# Patient Record
Sex: FEMALE | NOT HISPANIC OR LATINO | URBAN - METROPOLITAN AREA
[De-identification: names, ages, dates, MRNs, and addresses within clinical notes are randomized per-mention and may not be internally consistent; named-entity substitution may affect disease eponyms.]

---

## 2017-01-26 ENCOUNTER — IMPORTED ENCOUNTER (OUTPATIENT)
Dept: URBAN - METROPOLITAN AREA CLINIC 50 | Facility: CLINIC | Age: 67
End: 2017-01-26

## 2017-05-01 ENCOUNTER — IMPORTED ENCOUNTER (OUTPATIENT)
Dept: URBAN - METROPOLITAN AREA CLINIC 50 | Facility: CLINIC | Age: 67
End: 2017-05-01

## 2017-05-09 ENCOUNTER — IMPORTED ENCOUNTER (OUTPATIENT)
Dept: URBAN - METROPOLITAN AREA CLINIC 50 | Facility: CLINIC | Age: 67
End: 2017-05-09

## 2017-05-12 ENCOUNTER — IMPORTED ENCOUNTER (OUTPATIENT)
Dept: URBAN - METROPOLITAN AREA CLINIC 50 | Facility: CLINIC | Age: 67
End: 2017-05-12

## 2017-05-17 ENCOUNTER — IMPORTED ENCOUNTER (OUTPATIENT)
Dept: URBAN - METROPOLITAN AREA CLINIC 50 | Facility: CLINIC | Age: 67
End: 2017-05-17

## 2017-05-24 ENCOUNTER — IMPORTED ENCOUNTER (OUTPATIENT)
Dept: URBAN - METROPOLITAN AREA CLINIC 50 | Facility: CLINIC | Age: 67
End: 2017-05-24

## 2017-05-26 ENCOUNTER — IMPORTED ENCOUNTER (OUTPATIENT)
Dept: URBAN - METROPOLITAN AREA CLINIC 50 | Facility: CLINIC | Age: 67
End: 2017-05-26

## 2017-05-31 ENCOUNTER — IMPORTED ENCOUNTER (OUTPATIENT)
Dept: URBAN - METROPOLITAN AREA CLINIC 50 | Facility: CLINIC | Age: 67
End: 2017-05-31

## 2017-06-12 ENCOUNTER — IMPORTED ENCOUNTER (OUTPATIENT)
Dept: URBAN - METROPOLITAN AREA CLINIC 50 | Facility: CLINIC | Age: 67
End: 2017-06-12

## 2017-06-30 ENCOUNTER — IMPORTED ENCOUNTER (OUTPATIENT)
Dept: URBAN - METROPOLITAN AREA CLINIC 50 | Facility: CLINIC | Age: 67
End: 2017-06-30

## 2017-09-26 ENCOUNTER — IMPORTED ENCOUNTER (OUTPATIENT)
Dept: URBAN - METROPOLITAN AREA CLINIC 50 | Facility: CLINIC | Age: 67
End: 2017-09-26

## 2017-09-27 ENCOUNTER — IMPORTED ENCOUNTER (OUTPATIENT)
Dept: URBAN - METROPOLITAN AREA CLINIC 50 | Facility: CLINIC | Age: 67
End: 2017-09-27

## 2017-10-03 ENCOUNTER — IMPORTED ENCOUNTER (OUTPATIENT)
Dept: URBAN - METROPOLITAN AREA CLINIC 50 | Facility: CLINIC | Age: 67
End: 2017-10-03

## 2017-10-09 ENCOUNTER — IMPORTED ENCOUNTER (OUTPATIENT)
Dept: URBAN - METROPOLITAN AREA CLINIC 50 | Facility: CLINIC | Age: 67
End: 2017-10-09

## 2018-02-05 ENCOUNTER — IMPORTED ENCOUNTER (OUTPATIENT)
Dept: URBAN - METROPOLITAN AREA CLINIC 50 | Facility: CLINIC | Age: 68
End: 2018-02-05

## 2018-02-23 ENCOUNTER — IMPORTED ENCOUNTER (OUTPATIENT)
Dept: URBAN - METROPOLITAN AREA CLINIC 50 | Facility: CLINIC | Age: 68
End: 2018-02-23

## 2018-03-30 ENCOUNTER — IMPORTED ENCOUNTER (OUTPATIENT)
Dept: URBAN - METROPOLITAN AREA CLINIC 50 | Facility: CLINIC | Age: 68
End: 2018-03-30

## 2018-04-05 ENCOUNTER — IMPORTED ENCOUNTER (OUTPATIENT)
Dept: URBAN - METROPOLITAN AREA CLINIC 50 | Facility: CLINIC | Age: 68
End: 2018-04-05

## 2018-07-19 ENCOUNTER — IMPORTED ENCOUNTER (OUTPATIENT)
Dept: URBAN - METROPOLITAN AREA CLINIC 50 | Facility: CLINIC | Age: 68
End: 2018-07-19

## 2018-10-08 ENCOUNTER — IMPORTED ENCOUNTER (OUTPATIENT)
Dept: URBAN - METROPOLITAN AREA CLINIC 50 | Facility: CLINIC | Age: 68
End: 2018-10-08

## 2019-07-11 ENCOUNTER — IMPORTED ENCOUNTER (OUTPATIENT)
Dept: URBAN - METROPOLITAN AREA CLINIC 50 | Facility: CLINIC | Age: 69
End: 2019-07-11

## 2020-07-01 ENCOUNTER — IMPORTED ENCOUNTER (OUTPATIENT)
Dept: URBAN - METROPOLITAN AREA CLINIC 50 | Facility: CLINIC | Age: 70
End: 2020-07-01

## 2020-08-10 ENCOUNTER — IMPORTED ENCOUNTER (OUTPATIENT)
Dept: URBAN - METROPOLITAN AREA CLINIC 50 | Facility: CLINIC | Age: 70
End: 2020-08-10

## 2021-05-14 ASSESSMENT — VISUAL ACUITY
OS_OTHER: 20/30. 20/40.
OS_SC: 20/30
OD_OTHER: 20/30. 20/40.
OS_SC: 20/30-2+2
OS_CC: J1@ 15 IN
OS_OTHER: 20/60. 20/100.
OS_SC: 20/30-1
OD_PH: @ 16 IN
OS_CC: J1
OS_CC: J1+
OS_CC: 20/25-1
OS_BAT: 20/30
OS_BAT: 20/60
OD_CC: J1@ 15 IN
OD_PH: 20/30
OD_BAT: 20/60
OD_SC: 20/40
OD_CC: J1+@ 15 IN
OS_BAT: 20/30
OD_OTHER: 20/80. 20/100.
OS_CC: J1+
OD_SC: 20/25-1
OD_OTHER: 20/60. 20/100.
OD_CC: J1+@ 16 IN
OS_PH: 20/25
OS_OTHER: 20/30. 20/30.
OD_PH: 20/30-
OD_SC: 20/30-1
OD_CC: J1NEAR
OD_OTHER: 20/60. 20/100.
OS_PH: @ 16 IN
OS_SC: 20/50
OD_BAT: 20/40
OS_PH: 20/30-
OS_PH: @ 15 IN
OD_SC: 20/40
OS_PH: @ 17 IN
OS_OTHER: 20/30. 20/60.
OD_SC: 20/100
OS_BAT: 20/30
OS_BAT: 20/30
OS_BAT: 20/60
OD_SC: 20/40
OD_BAT: 20/40
OD_CC: J1+
OS_SC: 20/50
OD_CC: 20/30-1
OS_BAT: 20/50
OD_CC: J1
OD_BAT: 20/30
OS_CC: 20/40-
OS_SC: 20/40
OD_BAT: 20/30
OD_BAT: 20/30
OS_CC: 20/30-2
OD_OTHER: 20/40. 20/60.
OD_SC: 20/30
OS_OTHER: 20/30. 20/30.
OD_SC: 20/40+
OD_CC: J1+
OD_CC: 20/40
OS_OTHER: 20/30. 20/60.
OS_CC: J1
OS_PH: 20/25
OS_PH: 20/30+
OD_PH: @ 17 IN
OS_CC: J1+@ 16 IN
OS_BAT: 20/30
OD_PH: 20/25
OD_PH: @ 15 IN
OD_SC: 20/50
OD_PH: 20/25
OS_SC: 20/50
OS_SC: 20/30-
OD_OTHER: 20/30. 20/30.
OS_OTHER: 20/60. 20/80.
OS_SC: 20/60
OD_PH: 20/25
OS_OTHER: 20/30. 20/40.
OD_SC: 20/30
OS_CC: J1NEAR
OD_SC: 20/40
OS_PH: 20/30
OD_CC: 20/40
OS_BAT: 20/60
OD_OTHER: 20/40. 20/60.
OD_OTHER: 20/30. 20/30.
OS_CC: 20/80+-
OD_SC: 20/60
OD_BAT: 20/40
OD_PH: 20/30
OD_BAT: 20/60
OD_SC: 20/25
OS_SC: 20/40
OS_CC: J1+@ 15 IN
OS_OTHER: 20/60. 20/60.
OD_SC: 20/50-
OS_BAT: 20/30
OD_SC: 20/40
OS_PH: 20/30
OD_OTHER: 20/40. 20/40.
OD_OTHER: 20/50. 20/60.
OS_SC: 20/30
OD_CC: J1
OS_SC: 20/30
OS_OTHER: 20/50. 20/60.
OD_BAT: 20/80
OS_SC: 20/40+1
OD_BAT: 20/50
OS_SC: 20/50-

## 2021-05-14 ASSESSMENT — TONOMETRY
OS_IOP_MMHG: 12
OD_IOP_MMHG: 16
OD_IOP_MMHG: 15
OS_IOP_MMHG: 17
OS_IOP_MMHG: 18
OS_IOP_MMHG: 17
OD_IOP_MMHG: 18
OS_IOP_MMHG: 14
OD_IOP_MMHG: 17
OD_IOP_MMHG: 21
OS_IOP_MMHG: 38
OD_IOP_MMHG: 14
OS_IOP_MMHG: 17
OD_IOP_MMHG: 18
OS_IOP_MMHG: 14
OD_IOP_MMHG: 15
OD_IOP_MMHG: 14
OS_IOP_MMHG: 15
OS_IOP_MMHG: 13
OD_IOP_MMHG: 15
OD_IOP_MMHG: 16
OS_IOP_MMHG: 14
OS_IOP_MMHG: 15
OS_IOP_MMHG: 15
OS_IOP_MMHG: 13
OD_IOP_MMHG: 17
OD_IOP_MMHG: 14
OD_IOP_MMHG: 16
OD_IOP_MMHG: 18
OD_IOP_MMHG: 15
OS_IOP_MMHG: 17
OD_IOP_MMHG: 18
OD_IOP_MMHG: 19
OD_IOP_MMHG: 17
OS_IOP_MMHG: 17
OS_IOP_MMHG: 16
OS_IOP_MMHG: 15
OS_IOP_MMHG: 16
OD_IOP_MMHG: 18
OD_IOP_MMHG: 17
OS_IOP_MMHG: 21
OS_IOP_MMHG: 18
OD_IOP_MMHG: 13
OS_IOP_MMHG: 19
OS_IOP_MMHG: 18
OS_IOP_MMHG: 12
OS_IOP_MMHG: 16
OS_IOP_MMHG: 18
OD_IOP_MMHG: 13
OD_IOP_MMHG: 12

## 2021-05-14 ASSESSMENT — PACHYMETRY
OD_CT_UM: 591
OS_CT_UM: 590
OD_CT_UM: 591
OS_CT_UM: 590
OD_CT_UM: 591
OS_CT_UM: 590
OD_CT_UM: 591
OD_CT_UM: 591
OS_CT_UM: 590
OS_CT_UM: 590
OD_CT_UM: 591
OD_CT_UM: 591

## 2021-08-17 ENCOUNTER — PREPPED CHART (OUTPATIENT)
Dept: URBAN - METROPOLITAN AREA CLINIC 49 | Facility: CLINIC | Age: 71
End: 2021-08-17

## 2021-08-23 ENCOUNTER — ANNUAL COMPREHENSIVE EXAM (OUTPATIENT)
Dept: URBAN - METROPOLITAN AREA CLINIC 49 | Facility: CLINIC | Age: 71
End: 2021-08-23

## 2021-08-23 DIAGNOSIS — H43.813: ICD-10-CM

## 2021-08-23 DIAGNOSIS — H04.123: ICD-10-CM

## 2021-08-23 DIAGNOSIS — H52.4: ICD-10-CM

## 2021-08-23 PROCEDURE — 92015 DETERMINE REFRACTIVE STATE: CPT

## 2021-08-23 PROCEDURE — 92014 COMPRE OPH EXAM EST PT 1/>: CPT

## 2021-08-23 ASSESSMENT — VISUAL ACUITY
OS_GLARE: 20/40
OS_GLARE: 20/40
OD_GLARE: 20/40
OU_SC: J1
OD_GLARE: 20/40
OS_SC: 20/30
OD_SC: 20/30-2

## 2021-08-23 ASSESSMENT — TONOMETRY
OD_IOP_MMHG: 15
OD_IOP_MMHG: 17
OS_IOP_MMHG: 15
OS_IOP_MMHG: 17

## 2021-10-25 ENCOUNTER — EMERGENCY VISIT (OUTPATIENT)
Dept: URBAN - METROPOLITAN AREA CLINIC 48 | Facility: CLINIC | Age: 71
End: 2021-10-25

## 2021-10-25 DIAGNOSIS — H43.813: ICD-10-CM

## 2021-10-25 DIAGNOSIS — G43.B0: ICD-10-CM

## 2021-10-25 PROCEDURE — 92133 CPTRZD OPH DX IMG PST SGM ON: CPT

## 2021-10-25 PROCEDURE — 92014 COMPRE OPH EXAM EST PT 1/>: CPT

## 2021-10-25 PROCEDURE — 92134 CPTRZ OPH DX IMG PST SGM RTA: CPT

## 2021-10-25 ASSESSMENT — VISUAL ACUITY
OD_PH: 20/25-1
OS_SC: 20/40-1
OD_SC: 20/30-1
OS_PH: 20/30

## 2021-10-25 ASSESSMENT — TONOMETRY
OD_IOP_MMHG: 16
OS_IOP_MMHG: 18
OD_IOP_MMHG: 14
OS_IOP_MMHG: 16

## 2021-10-25 NOTE — PATIENT DISCUSSION
Patient reports episode of wavy lines x 20 minutes OS this AM. Patient denies headache/TIA/GCA symptoms. Patient denies blacking out or loss of vision. Symptoms likely secondary to ocular migraine. Advised to go to ER stat if sudden vision loss or GCA/TIA symptoms. Patient verbally expressed understanding. Recommend patient follow up with PCP and recommend further work up if symptoms continue per PCP discretion.  Letter/Note sent to Primary MD.

## 2022-09-01 ENCOUNTER — COMPREHENSIVE EXAM (OUTPATIENT)
Dept: URBAN - METROPOLITAN AREA CLINIC 49 | Facility: CLINIC | Age: 72
End: 2022-09-01

## 2022-09-01 DIAGNOSIS — H43.813: ICD-10-CM

## 2022-09-01 DIAGNOSIS — H26.491: ICD-10-CM

## 2022-09-01 DIAGNOSIS — H16.223: ICD-10-CM

## 2022-09-01 PROCEDURE — 92014 COMPRE OPH EXAM EST PT 1/>: CPT

## 2022-09-01 PROCEDURE — 92134 CPTRZ OPH DX IMG PST SGM RTA: CPT

## 2022-09-01 RX ORDER — LOTEPREDNOL ETABONATE 5 MG/ML: 1 SUSPENSION/ DROPS OPHTHALMIC TWICE A DAY

## 2022-09-01 RX ORDER — LIFITEGRAST 50 MG/ML: 1 SOLUTION/ DROPS OPHTHALMIC TWICE A DAY

## 2022-09-01 ASSESSMENT — VISUAL ACUITY
OS_SC: 20/40+1
OS_GLARE: 20/60
OD_GLARE: 20/100
OS_SC: J1@16"
OD_GLARE: 20/70
OS_PH: 20/30
OD_PH: 20/30
OD_SC: J3@16"
OS_GLARE: 20/40
OD_SC: 20/50-1

## 2022-09-01 ASSESSMENT — TONOMETRY
OS_IOP_MMHG: 12
OS_IOP_MMHG: 16
OD_IOP_MMHG: 12
OD_IOP_MMHG: 16

## 2022-09-01 NOTE — PATIENT DISCUSSION
Gretel Chol: Patient counseled as to delayed onset effect of Margurette Matas. Discussed onset of action may take 2-3 months to achieve maximum effect. Reviewed with patient mild degree of irritation and burning with onset of usage. Patient verbalizes understanding and wishes to start medication. Will e-prescribe medication to patient's pharmacy and follow up to determine effect.

## 2022-09-01 NOTE — PATIENT DISCUSSION
Recommend PF artificial tears 4 times daily in both eyes for best vision and comfort. Brands such as Refresh, Thera Tears, and Systane are good options. Continue warm compresses BID OU. Start gel drops QHS OU.

## 2022-09-01 NOTE — PATIENT DISCUSSION
GLAUCOMA SUSPECT-C/D: The patient is a glaucoma suspect because of suspicious appearance of the optic disc(s). OS>OD. IOP today 12/12 ajdusted. RTC in 6 months for IOP check/HVF/RNFL. (+) family HX.

## 2022-09-01 NOTE — PATIENT DISCUSSION
Discussed RX drop. Start steriod drop for 2 weeks then start Xiidra BID OU, sample given. Patient to call back if too expensive. RTC in 6-7 weeks follow up.

## 2022-10-20 ENCOUNTER — FOLLOW UP (OUTPATIENT)
Dept: URBAN - METROPOLITAN AREA CLINIC 49 | Facility: CLINIC | Age: 72
End: 2022-10-20

## 2022-10-20 DIAGNOSIS — H40.013: ICD-10-CM

## 2022-10-20 DIAGNOSIS — H16.223: ICD-10-CM

## 2022-10-20 PROCEDURE — 92012 INTRM OPH EXAM EST PATIENT: CPT

## 2022-10-20 PROCEDURE — 92083 EXTENDED VISUAL FIELD XM: CPT

## 2022-10-20 PROCEDURE — 92133 CPTRZD OPH DX IMG PST SGM ON: CPT

## 2022-10-20 ASSESSMENT — VISUAL ACUITY
OS_SC: 20/40
OD_SC: 20/40

## 2022-10-20 ASSESSMENT — TONOMETRY
OS_IOP_MMHG: 18
OD_IOP_MMHG: 18
OD_IOP_MMHG: 14
OS_IOP_MMHG: 14

## 2022-10-20 NOTE — PATIENT DISCUSSION
OCT RNFL (10/2022) borderline thin sup OD, WNL OS.  HVF (10/2022) inf defect OD, non-glaucomatous OS. Patient states she was confused about testing when she started OD. Will repeat HVF at next appointment.

## 2022-10-20 NOTE — PATIENT DISCUSSION
GLAUCOMA SUSPECT-C/D: The patient is a glaucoma suspect because of suspicious appearance of the optic disc(s). OS>OD. IOP today 14/14 ajdusted.  RTC In 6 months for annual exam. Kath Hanson over testing with patient at todays exam. Will repeat testing in 2-3 months due to optic nerve thinning and missed points, patient states that she does not feel test was reliable. Discussed possible drop therapy after repeat testing.

## 2022-10-20 NOTE — PATIENT DISCUSSION
Improving. Discussed that while Nini Proctor it is okay to continue regimen. Recommend PF artificial tears 4 times daily in both eyes for best vision and comfort. Brands such as Refresh, Thera Tears, and Systane are good options. Continue warm compresses BID OU. Continue gel drops QHS OU. Start gel drops QHS OU.

## 2023-01-05 ENCOUNTER — FOLLOW UP (OUTPATIENT)
Dept: URBAN - METROPOLITAN AREA CLINIC 53 | Facility: CLINIC | Age: 73
End: 2023-01-05

## 2023-01-05 DIAGNOSIS — H40.013: ICD-10-CM

## 2023-01-05 DIAGNOSIS — H16.223: ICD-10-CM

## 2023-01-05 PROCEDURE — 92012 INTRM OPH EXAM EST PATIENT: CPT

## 2023-01-05 PROCEDURE — 92133 CPTRZD OPH DX IMG PST SGM ON: CPT

## 2023-01-05 PROCEDURE — 92083 EXTENDED VISUAL FIELD XM: CPT

## 2023-01-05 ASSESSMENT — TONOMETRY
OS_IOP_MMHG: 17
OD_IOP_MMHG: 13
OS_IOP_MMHG: 13
OD_IOP_MMHG: 17

## 2023-01-05 ASSESSMENT — VISUAL ACUITY
OD_SC: 20/40
OS_SC: 20/25-2
OD_PH: 20/30

## 2023-01-05 NOTE — PATIENT DISCUSSION
Improving. Continue Xiidra BID OU. Continue warm compresses BID OU. Continue gel drops QHS OU. Continue PF artificial tears 3-4x a day OU.

## 2023-01-05 NOTE — PATIENT DISCUSSION
IOP stable today 17 ou, 13 ou adjusted. OCT RNFL/HVF done today, WNL. HVF shows improvement from previous. RTC in 8 months for Comp exam with RNFL.

## 2023-01-05 NOTE — PATIENT DISCUSSION
GLAUCOMA SUSPECT-C/D: The patient is a glaucoma suspect because of suspicious appearance of the optic disc(s). OS>OD.  RTC In 6 months for annual exam. Cherry Chance over testing with patient at todays exam. Will repeat testing in 2-3 months due to optic nerve thinning and missed points, patient states that she does not feel test was reliable. Discussed possible drop therapy after repeat testing.

## 2023-09-12 ENCOUNTER — COMPREHENSIVE EXAM (OUTPATIENT)
Dept: URBAN - METROPOLITAN AREA CLINIC 53 | Facility: CLINIC | Age: 73
End: 2023-09-12

## 2023-09-12 DIAGNOSIS — H40.013: ICD-10-CM

## 2023-09-12 DIAGNOSIS — H43.813: ICD-10-CM

## 2023-09-12 DIAGNOSIS — H26.491: ICD-10-CM

## 2023-09-12 DIAGNOSIS — H16.223: ICD-10-CM

## 2023-09-12 PROCEDURE — 99214 OFFICE O/P EST MOD 30 MIN: CPT

## 2023-09-12 PROCEDURE — 92133 CPTRZD OPH DX IMG PST SGM ON: CPT

## 2023-09-12 ASSESSMENT — TONOMETRY
OD_IOP_MMHG: 14
OS_IOP_MMHG: 13
OS_IOP_MMHG: 17
OD_IOP_MMHG: 18

## 2023-09-12 ASSESSMENT — VISUAL ACUITY
OS_GLARE: 20/30
OD_SC: 20/25
OU_SC: J1+
OS_GLARE: 20/30
OD_GLARE: 20/25
OD_GLARE: 20/25
OS_SC: 20/25

## 2024-03-29 ENCOUNTER — FOLLOW UP (OUTPATIENT)
Dept: URBAN - METROPOLITAN AREA CLINIC 53 | Facility: CLINIC | Age: 74
End: 2024-03-29

## 2024-03-29 DIAGNOSIS — H16.223: ICD-10-CM

## 2024-03-29 DIAGNOSIS — H40.013: ICD-10-CM

## 2024-03-29 PROCEDURE — 99213 OFFICE O/P EST LOW 20 MIN: CPT

## 2024-03-29 PROCEDURE — 92083 EXTENDED VISUAL FIELD XM: CPT

## 2024-03-29 PROCEDURE — 92133 CPTRZD OPH DX IMG PST SGM ON: CPT

## 2024-03-29 ASSESSMENT — TONOMETRY
OS_IOP_MMHG: 17
OD_IOP_MMHG: 18
OD_IOP_MMHG: 14
OS_IOP_MMHG: 13

## 2024-03-29 ASSESSMENT — VISUAL ACUITY
OS_SC: 20/30-2
OS_PH: 20/25
OD_SC: 20/25-2

## 2024-06-04 ENCOUNTER — DIAGNOSTICS ONLY (OUTPATIENT)
Dept: URBAN - METROPOLITAN AREA CLINIC 53 | Facility: CLINIC | Age: 74
End: 2024-06-04

## 2024-06-04 DIAGNOSIS — H40.013: ICD-10-CM

## 2024-06-04 PROCEDURE — 92083 EXTENDED VISUAL FIELD XM: CPT

## 2025-03-04 ENCOUNTER — COMPREHENSIVE EXAM (OUTPATIENT)
Age: 75
End: 2025-03-04

## 2025-03-04 DIAGNOSIS — H26.493: ICD-10-CM

## 2025-03-04 DIAGNOSIS — H16.223: ICD-10-CM

## 2025-03-04 DIAGNOSIS — H43.813: ICD-10-CM

## 2025-03-04 DIAGNOSIS — H40.023: ICD-10-CM

## 2025-03-04 PROCEDURE — 99214 OFFICE O/P EST MOD 30 MIN: CPT

## 2025-03-04 RX ORDER — CARBOXYMETHYLCELLULOSE SODIUM AND GLYCERIN 5; 9 MG/ML; MG/ML
1 SOLUTION/ DROPS OPHTHALMIC
Start: 2025-03-04

## 2025-03-04 RX ORDER — POLYETHYLENE GLYCOL 400 2.5 MG/ML: 1 SOLUTION/ DROPS OPHTHALMIC

## 2025-05-02 ENCOUNTER — DIAGNOSTICS ONLY (OUTPATIENT)
Age: 75
End: 2025-05-02

## 2025-05-02 DIAGNOSIS — H40.023: ICD-10-CM

## 2025-05-02 PROCEDURE — 92083 EXTENDED VISUAL FIELD XM: CPT

## 2025-05-02 PROCEDURE — 92133 CPTRZD OPH DX IMG PST SGM ON: CPT

## 2025-08-26 ENCOUNTER — FOLLOW UP (OUTPATIENT)
Age: 75
End: 2025-08-26

## 2025-08-26 DIAGNOSIS — H40.023: ICD-10-CM

## 2025-08-26 DIAGNOSIS — H16.223: ICD-10-CM

## 2025-08-26 DIAGNOSIS — S05.02XA: ICD-10-CM

## 2025-08-26 PROCEDURE — 99213 OFFICE O/P EST LOW 20 MIN: CPT | Mod: 25

## 2025-08-26 PROCEDURE — 92071 CONTACT LENS FITTING FOR TX: CPT

## 2025-08-26 PROCEDURE — 6876150 PUNCTUM PLUG, BILATERAL

## 2025-08-26 RX ORDER — OFLOXACIN 3 MG/ML
1 SOLUTION OPHTHALMIC
Start: 2025-08-26